# Patient Record
(demographics unavailable — no encounter records)

---

## 2024-11-12 NOTE — ADDENDUM
[FreeTextEntry1] : 2D ECHO 01/22/21: LVEF 68% Moderate apical hypertrophy (1.9 cm) Mild MR, TR.  Impression: No cardiac contraindications to colonoscopy  Recommend: Can hold Xarelto for 3 days. No SBE prophylaxis.  Jak Henderson MD  8/20/24: No cardiac contraindications to endoscopy procedure Can hold Xarelto for 3 days prior to procedure.  No SBE prophylaxis.

## 2024-11-12 NOTE — CARDIOLOGY SUMMARY
[de-identified] : 11/12/24: SR with PVCs, LVH with diffuse ST-T changes (stable).  [de-identified] : 01/22/21:\par  LVEF 68%\par  Apical hypertrophy (1.9 cm)\par  Mild MR, TR.

## 2024-11-12 NOTE — ASSESSMENT
[FreeTextEntry1] : Non-obstructive apical hypertrophic cardiomyopathy, stable. Most likely, 1 TIA, likely due to intermittent compliance with Xarelto. CAD, no angina on current therapy. No evidence of ischemia on last MPI.   PAF, s/p ablation. Compliant with anticoagulation. Hyperlipidemia.  Plan: Continue current treatment. Diet, exercise, weight loss discussed. Repeat fasting blood work ordered. F/u in 6 months  Jak Henderson MD

## 2024-11-12 NOTE — REVIEW OF SYSTEMS
[Negative] : Neurological [Lower Ext Edema] : no extremity edema [Leg Claudication] : no intermittent leg claudication [Orthopnea] : no orthopnea [Syncope] : no syncope [Rash] : no rash [Easy Bleeding] : no tendency for easy bleeding [Easy Bruising] : no tendency for easy bruising

## 2024-11-12 NOTE — HISTORY OF PRESENT ILLNESS
[FreeTextEntry1] : 72-yo male with h/o apical hypertrophic cardiomyopathy, HTN, hyperlipidemia, A-fib s/p ablation on Xareto S/p second-opinion cardiac and EP evaluation. Patient has opted not to proceed with ICD.  H/o CAD, PCI of LCX. [facility] in 2015 showed patent LCX stent, small-vessel CAD. MPI normal since then.  Pt presents for a follow up visit. Pt denies CP, SOB or palpitations. No edema. Pt takes walks for exercise. BP at home normal.   TTE 11/12/24:  Apical hypertrophy 1.9 cm Normal LVEF and diastolic function.  Trig 208 LDL 35 GFR 60 A1c 6.9